# Patient Record
Sex: MALE | Race: WHITE | HISPANIC OR LATINO | Employment: OTHER | URBAN - METROPOLITAN AREA
[De-identification: names, ages, dates, MRNs, and addresses within clinical notes are randomized per-mention and may not be internally consistent; named-entity substitution may affect disease eponyms.]

---

## 2021-02-12 ENCOUNTER — IMMUNIZATION (OUTPATIENT)
Dept: VACCINE CLINIC | Facility: HOSPITAL | Age: 86
End: 2021-02-12

## 2021-02-12 PROCEDURE — 0011A: CPT | Performed by: INTERNAL MEDICINE

## 2021-02-12 PROCEDURE — 91301 HC SARSCO02 VAC 100MCG/0.5ML IM: CPT | Performed by: INTERNAL MEDICINE

## 2021-03-12 ENCOUNTER — IMMUNIZATION (OUTPATIENT)
Dept: VACCINE CLINIC | Facility: HOSPITAL | Age: 86
End: 2021-03-12

## 2021-03-12 PROCEDURE — 91301 HC SARSCO02 VAC 100MCG/0.5ML IM: CPT | Performed by: PHYSICIAN ASSISTANT

## 2021-03-12 PROCEDURE — 0012A: CPT | Performed by: PHYSICIAN ASSISTANT

## 2022-08-30 ENCOUNTER — OFFICE VISIT (OUTPATIENT)
Dept: INTERNAL MEDICINE | Facility: CLINIC | Age: 87
End: 2022-08-30

## 2022-08-30 VITALS
HEIGHT: 70 IN | DIASTOLIC BLOOD PRESSURE: 68 MMHG | RESPIRATION RATE: 16 BRPM | SYSTOLIC BLOOD PRESSURE: 170 MMHG | HEART RATE: 68 BPM | BODY MASS INDEX: 28.35 KG/M2 | OXYGEN SATURATION: 96 % | TEMPERATURE: 97.7 F | WEIGHT: 198 LBS

## 2022-08-30 DIAGNOSIS — E78.2 MIXED HYPERLIPIDEMIA: ICD-10-CM

## 2022-08-30 DIAGNOSIS — I10 BENIGN ESSENTIAL HYPERTENSION: Primary | ICD-10-CM

## 2022-08-30 DIAGNOSIS — E11.65 TYPE 2 DIABETES MELLITUS WITH HYPERGLYCEMIA, WITHOUT LONG-TERM CURRENT USE OF INSULIN: ICD-10-CM

## 2022-08-30 PROCEDURE — 99204 OFFICE O/P NEW MOD 45 MIN: CPT | Performed by: INTERNAL MEDICINE

## 2022-08-30 RX ORDER — ROSUVASTATIN CALCIUM 20 MG/1
1 TABLET, COATED ORAL DAILY
COMMUNITY
Start: 2022-07-28 | End: 2023-01-16 | Stop reason: SDUPTHER

## 2022-08-30 RX ORDER — CARVEDILOL 6.25 MG/1
2 TABLET ORAL DAILY
COMMUNITY
Start: 2022-08-26 | End: 2023-01-16 | Stop reason: SDUPTHER

## 2022-11-23 ENCOUNTER — TELEPHONE (OUTPATIENT)
Dept: INTERNAL MEDICINE | Facility: CLINIC | Age: 87
End: 2022-11-23

## 2022-11-23 NOTE — TELEPHONE ENCOUNTER
Caller: Kieran Jackman    Relationship: Self    Best call back number: [phone] OR CALL HOME NUMBER: [phone]    What form or medical record are you requesting: BLOOD WORK ORDER PAPERWORK     Who is requesting this form or medical record from you: KIERAN JACKMAN    How would you like to receive the form or medical records (pick-up, mail, fax):     PATIENT WOULD LIKE TO HAVE THE BLOOD WORK ORDER PAPERWORK MAILED TO HIM AT THE ADDRESS LISTED ON FILE.     Timeframe paperwork needed: NO LATER THAN January 1st, 2023    Additional notes: PATIENT IS REQUESTING TO HAVE BLOOD WORK DRAWN AT [facility].    PATIENT ADVISED HE MISPLACED THE ORIGINAL PAPERWORK REGARDING BLOOD WORK ORDERS AND IS REQUESTING NEW COPIES BE MAILED TO HIM.

## 2023-01-16 ENCOUNTER — OFFICE VISIT (OUTPATIENT)
Dept: INTERNAL MEDICINE | Facility: CLINIC | Age: 88
End: 2023-01-16
Payer: COMMERCIAL

## 2023-01-16 VITALS
DIASTOLIC BLOOD PRESSURE: 76 MMHG | WEIGHT: 199 LBS | OXYGEN SATURATION: 97 % | HEART RATE: 68 BPM | TEMPERATURE: 97.7 F | HEIGHT: 70 IN | BODY MASS INDEX: 28.49 KG/M2 | RESPIRATION RATE: 16 BRPM | SYSTOLIC BLOOD PRESSURE: 148 MMHG

## 2023-01-16 DIAGNOSIS — N18.30 STAGE 3 CHRONIC KIDNEY DISEASE, UNSPECIFIED WHETHER STAGE 3A OR 3B CKD: ICD-10-CM

## 2023-01-16 DIAGNOSIS — E78.2 MIXED HYPERLIPIDEMIA: ICD-10-CM

## 2023-01-16 DIAGNOSIS — Z00.00 ROUTINE GENERAL MEDICAL EXAMINATION AT A HEALTH CARE FACILITY: Primary | ICD-10-CM

## 2023-01-16 DIAGNOSIS — E11.65 TYPE 2 DIABETES MELLITUS WITH HYPERGLYCEMIA, WITHOUT LONG-TERM CURRENT USE OF INSULIN: ICD-10-CM

## 2023-01-16 DIAGNOSIS — Z23 NEED FOR PNEUMOCOCCAL VACCINATION: ICD-10-CM

## 2023-01-16 DIAGNOSIS — I10 BENIGN ESSENTIAL HYPERTENSION: ICD-10-CM

## 2023-01-16 PROCEDURE — 90677 PCV20 VACCINE IM: CPT | Performed by: INTERNAL MEDICINE

## 2023-01-16 PROCEDURE — 99397 PER PM REEVAL EST PAT 65+ YR: CPT | Performed by: INTERNAL MEDICINE

## 2023-01-16 PROCEDURE — 90471 IMMUNIZATION ADMIN: CPT | Performed by: INTERNAL MEDICINE

## 2023-01-16 RX ORDER — GLIMEPIRIDE 4 MG/1
4 TABLET ORAL DAILY
Qty: 90 TABLET | Refills: 1 | Status: SHIPPED | OUTPATIENT
Start: 2023-01-16

## 2023-01-16 RX ORDER — ROSUVASTATIN CALCIUM 20 MG/1
20 TABLET, COATED ORAL NIGHTLY
Qty: 90 TABLET | Refills: 1 | Status: SHIPPED | OUTPATIENT
Start: 2023-01-16

## 2023-01-16 RX ORDER — CARVEDILOL 6.25 MG/1
TABLET ORAL
COMMUNITY
End: 2023-01-16 | Stop reason: SDUPTHER

## 2023-01-16 RX ORDER — OLMESARTAN MEDOXOMIL 20 MG/1
TABLET ORAL
COMMUNITY
Start: 2022-11-21 | End: 2023-01-16 | Stop reason: SDUPTHER

## 2023-01-16 RX ORDER — TAMSULOSIN HYDROCHLORIDE 0.4 MG/1
1 CAPSULE ORAL NIGHTLY
Qty: 90 CAPSULE | Refills: 1 | Status: SHIPPED | OUTPATIENT
Start: 2023-01-16

## 2023-01-16 RX ORDER — TAMSULOSIN HYDROCHLORIDE 0.4 MG/1
CAPSULE ORAL
COMMUNITY
End: 2023-01-16 | Stop reason: SDUPTHER

## 2023-01-16 RX ORDER — OLMESARTAN MEDOXOMIL 20 MG/1
20 TABLET ORAL DAILY
Qty: 90 TABLET | Refills: 1 | Status: SHIPPED | OUTPATIENT
Start: 2023-01-16

## 2023-01-16 RX ORDER — CARVEDILOL 12.5 MG/1
12.5 TABLET ORAL 2 TIMES DAILY WITH MEALS
Qty: 180 TABLET | Refills: 1 | Status: SHIPPED | OUTPATIENT
Start: 2023-01-16

## 2023-01-16 NOTE — PROGRESS NOTES
Subjective   Kieran Clark is a 87 y.o. male.     Chief Complaint   Patient presents with   • Annual Exam     Lab work done [facility]       History of Present Illness   Patient is here for a physical exam, he is also to follow-up on his blood pressure, he is also here to follow-up on lab work done for sugar and cholesterol, he is due for a Prevnar  Review of Systems   All other systems reviewed and are negative.      Past Medical History:   Diagnosis Date   • Diabetes mellitus (HCC)    • Hyperlipidemia    • Hypertension        Past Surgical History:   Procedure Laterality Date   • COLONOSCOPY  2018    [place]   • SPINE SURGERY     • WRIST SURGERY Right        History reviewed. No pertinent family history.     reports that he quit smoking about 38 years ago. His smoking use included cigarettes. He has a 12.50 pack-year smoking history. He has never used smokeless tobacco. He reports current alcohol use. He reports that he does not use drugs.    No Known Allergies        Current Outpatient Medications:   •  aspirin 81 MG oral suspension, , Disp: , Rfl:   •  carvedilol (COREG) 12.5 MG tablet, Take 1 tablet by mouth 2 (Two) Times a Day With Meals., Disp: 180 tablet, Rfl: 1  •  Cholecalciferol (Vitamin D3) 25 MCG (1000 UT) capsule, , Disp: , Rfl:   •  Cyanocobalamin (Vitamin B12) 1000 MCG tablet controlled-release, , Disp: , Rfl:   •  Gluc-Chonn-MSM-Boswellia-Vit D (GLUCOS CHONDROIT, BOSWELLIA, PO), , Disp: , Rfl:   •  metFORMIN (GLUCOPHAGE) 500 MG tablet, Take 3 tablets by mouth Daily., Disp: 90 tablet, Rfl: 5  •  olmesartan (BENICAR) 20 MG tablet, Take 1 tablet by mouth Daily., Disp: 90 tablet, Rfl: 1  •  rosuvastatin (CRESTOR) 20 MG tablet, Take 1 tablet by mouth Every Night., Disp: 90 tablet, Rfl: 1  •  tamsulosin (FLOMAX) 0.4 MG capsule 24 hr capsule, Take 1 capsule by mouth Every Night., Disp: 90 capsule, Rfl: 1  •  glimepiride (Amaryl) 4 MG tablet, Take 1 tablet by mouth Daily., Disp: 90 tablet, Rfl:  "1      Objective   Blood pressure 148/76, pulse 68, temperature 97.7 °F (36.5 °C), resp. rate 16, height 177.8 cm (70\"), weight 90.3 kg (199 lb), SpO2 97 %.    Physical Exam  Vitals and nursing note reviewed.   Constitutional:       General: He is not in acute distress.     Appearance: Normal appearance. He is not diaphoretic.   HENT:      Head: Normocephalic and atraumatic.      Right Ear: External ear normal.      Left Ear: External ear normal.      Nose: Nose normal.   Eyes:      Extraocular Movements: Extraocular movements intact.      Conjunctiva/sclera: Conjunctivae normal.   Neck:      Trachea: Trachea normal.   Cardiovascular:      Rate and Rhythm: Normal rate and regular rhythm.      Heart sounds: Normal heart sounds.   Pulmonary:      Effort: Pulmonary effort is normal. No respiratory distress.   Abdominal:      General: Abdomen is flat.   Musculoskeletal:      Cervical back: Neck supple.      Comments: Moves all limbs  Walks with cane   Skin:     General: Skin is warm and dry.      Findings: No erythema.   Neurological:      Mental Status: He is alert and oriented to person, place, and time.      Comments: No gross motor or sensory deficits         BMI is >= 25 and <30. (Overweight) The following options were offered after discussion;: weight loss educational material (shared in after visit summary), exercise counseling/recommendations and nutrition counseling/recommendations      No results found for this or any previous visit.      Assessment & Plan   Diagnoses and all orders for this visit:    1. Routine general medical examination at a health care facility (Primary)    2. Benign essential hypertension  -     olmesartan (BENICAR) 20 MG tablet; Take 1 tablet by mouth Daily.  Dispense: 90 tablet; Refill: 1  -     carvedilol (COREG) 12.5 MG tablet; Take 1 tablet by mouth 2 (Two) Times a Day With Meals.  Dispense: 180 tablet; Refill: 1    3. Mixed hyperlipidemia  -     CBC & Differential  -     " Comprehensive Metabolic Panel  -     Lipid Panel  -     rosuvastatin (CRESTOR) 20 MG tablet; Take 1 tablet by mouth Every Night.  Dispense: 90 tablet; Refill: 1    4. Type 2 diabetes mellitus with hyperglycemia, without long-term current use of insulin ([facility])  -     Hemoglobin A1c  -     Microalbumin / Creatinine Urine Ratio - Urine, Clean Catch  -     glimepiride (Amaryl) 4 MG tablet; Take 1 tablet by mouth Daily.  Dispense: 90 tablet; Refill: 1  -     metFORMIN (GLUCOPHAGE) 500 MG tablet; Take 3 tablets by mouth Daily.  Dispense: 90 tablet; Refill: 5    5. Stage 3 chronic kidney disease, unspecified whether stage 3a or 3b CKD ([facility])  -     Ambulatory Referral to Nephrology    6. Need for pneumococcal vaccination  -     Pneumococcal Conjugate Vaccine 20-[facility]    Other orders  -     tamsulosin (FLOMAX) 0.4 MG capsule 24 hr capsule; Take 1 capsule by mouth Every Night.  Dispense: 90 capsule; Refill: 1      plan:  1.physical: Physical exam conducted today,   reviewed fasting lab work,   Impression: healthy adult Patient. Currently, patient eats a healthy diet. Screening lab work includes glucose, lipid profile , complete blood count and comprehensive panel . The risks and benefits of immunizations were discussed and immunizations are up to date. Advice and education were given regarding nutrition and aerobic exercise. Patient discussion: discussed with the patient.  Physical with preventive exam as well as age and risk appropriate counseling completed.   Patient Discussion: plan discussed with the patient, follow-up visit needed in one year. Medication Review and medication list updated  Advised   regular weight-bearing exercise  2.  Benign essential hypertension: Will continue current medication and increase to carvedilol 12.5 mg p.o. twice daily, low-sodium diet advised, Counseled to regularly check BP at home with goal averaging <130/80.   3.mixed hyperlipidemia: Reviewed fasting CMP and lipid panel.  Diet and  exercise counseled,  Will continue current medications  4. Diabetes  Mellitus  : Reviewed fasting CMP  and hba1c   7.3 , diet and exercise counseled , Will continue current medications  5.   ckd stage 3 : Labs reviewed, oral hydration, monitor BMP , refer to nephrology  6.  Need for Prevnar 20: Given today and well-tolerated             Yarelis Ruiz MD

## 2023-01-20 ENCOUNTER — FLU SHOT (OUTPATIENT)
Dept: INTERNAL MEDICINE | Facility: CLINIC | Age: 88
End: 2023-01-20
Payer: COMMERCIAL

## 2023-01-20 DIAGNOSIS — Z23 NEED FOR PNEUMOCOCCAL VACCINATION: Primary | ICD-10-CM

## 2023-03-28 ENCOUNTER — TRANSCRIBE ORDERS (OUTPATIENT)
Dept: ADMINISTRATIVE | Facility: HOSPITAL | Age: 88
End: 2023-03-28
Payer: COMMERCIAL

## 2023-03-28 DIAGNOSIS — N18.31 CHRONIC KIDNEY DISEASE (CKD) STAGE G3A/A1, MODERATELY DECREASED GLOMERULAR FILTRATION RATE (GFR) BETWEEN 45-59 ML/MIN/1.73 SQUARE METER AND ALBUMINURIA CREATININE RATIO LESS THAN 30 MG/G (CMS/H*: Primary | ICD-10-CM

## 2023-05-15 ENCOUNTER — HOSPITAL ENCOUNTER (OUTPATIENT)
Dept: ULTRASOUND IMAGING | Facility: HOSPITAL | Age: 88
Discharge: HOME | End: 2023-05-15
Admitting: PHYSICIAN ASSISTANT
Payer: COMMERCIAL

## 2023-05-15 DIAGNOSIS — N18.31 CHRONIC KIDNEY DISEASE (CKD) STAGE G3A/A1, MODERATELY DECREASED GLOMERULAR FILTRATION RATE (GFR) BETWEEN 45-59 ML/MIN/1.73 SQUARE METER AND ALBUMINURIA CREATININE RATIO LESS THAN 30 MG/G (CMS/H*: ICD-10-CM

## 2023-05-15 PROCEDURE — 76775 US EXAM ABDO BACK WALL LIM: CPT

## 2023-11-08 ENCOUNTER — OFFICE VISIT (OUTPATIENT)
Dept: INTERNAL MEDICINE | Facility: CLINIC | Age: 88
End: 2023-11-08
Payer: COMMERCIAL

## 2023-11-08 VITALS
WEIGHT: 180 LBS | DIASTOLIC BLOOD PRESSURE: 71 MMHG | TEMPERATURE: 98.2 F | BODY MASS INDEX: 25.77 KG/M2 | RESPIRATION RATE: 16 BRPM | HEIGHT: 70 IN | SYSTOLIC BLOOD PRESSURE: 150 MMHG | OXYGEN SATURATION: 99 % | HEART RATE: 63 BPM

## 2023-11-08 DIAGNOSIS — E11.9 DIABETES MELLITUS WITHOUT COMPLICATION: ICD-10-CM

## 2023-11-08 DIAGNOSIS — E78.2 MIXED HYPERLIPIDEMIA: ICD-10-CM

## 2023-11-08 DIAGNOSIS — Z23 NEED FOR IMMUNIZATION AGAINST INFLUENZA: ICD-10-CM

## 2023-11-08 DIAGNOSIS — N18.31 STAGE 3A CHRONIC KIDNEY DISEASE: ICD-10-CM

## 2023-11-08 DIAGNOSIS — I10 BENIGN ESSENTIAL HYPERTENSION: Primary | ICD-10-CM

## 2023-11-08 PROCEDURE — 99214 OFFICE O/P EST MOD 30 MIN: CPT | Performed by: INTERNAL MEDICINE

## 2023-11-08 PROCEDURE — 90471 IMMUNIZATION ADMIN: CPT | Performed by: INTERNAL MEDICINE

## 2023-11-08 PROCEDURE — 90662 IIV NO PRSV INCREASED AG IM: CPT | Performed by: INTERNAL MEDICINE

## 2023-11-08 RX ORDER — CARVEDILOL 12.5 MG/1
12.5 TABLET ORAL 2 TIMES DAILY WITH MEALS
Qty: 180 TABLET | Refills: 1 | Status: SHIPPED | OUTPATIENT
Start: 2023-11-08

## 2023-11-08 RX ORDER — ROSUVASTATIN CALCIUM 20 MG/1
20 TABLET, COATED ORAL NIGHTLY
Qty: 90 TABLET | Refills: 1 | Status: SHIPPED | OUTPATIENT
Start: 2023-11-08

## 2023-11-08 RX ORDER — OLMESARTAN MEDOXOMIL 20 MG/1
20 TABLET ORAL DAILY
Qty: 90 TABLET | Refills: 1 | Status: SHIPPED | OUTPATIENT
Start: 2023-11-08

## 2023-11-08 RX ORDER — TAMSULOSIN HYDROCHLORIDE 0.4 MG/1
1 CAPSULE ORAL NIGHTLY
Qty: 90 CAPSULE | Refills: 1 | Status: SHIPPED | OUTPATIENT
Start: 2023-11-08

## 2023-11-08 NOTE — PROGRESS NOTES
"Chief Complaint  Hypertension and Urinary Tract Infection ([facility] admit 10/19/2023 discharge 10/21/2023)    Rubens Clark presents to [facility]  HPI: Patient is here to follow up on the blood pressure  The patient is taking the blood pressure medications as prescribed and has had no side effects. The patient is also here to follow up on the cholesterol and sugar and is trying to follow a diet.  He is noted to have lost weight from his recent illness, the patient is  also here to follow up on ckd  and is  due to get lab work done .  He follows up with nephrology, the patient also needs refills on medications and flu vaccine.  Patient states he was admitted at [facility] October 19, 2023 and discharged on October 21, 2023 for acute urinary tract infection and [facility]  medical records have been reviewed  Hyperlipidemia   Pertinent negatives include no chest pain or shortness of breath.   Hypertension   Pertinent negatives include no chest pain, palpitations or shortness of breath.    Hypertension    Urinary Tract Infection       Objective   Vital Signs:  /71   Pulse 63   Temp 98.2 °F (36.8 °C)   Resp 16   Ht 177.8 cm (70\")   Wt 81.6 kg (180 lb)   SpO2 99%   BMI 25.83 kg/m²   Estimated body mass index is 25.83 kg/m² as calculated from the following:    Height as of this encounter: 177.8 cm (70\").    Weight as of this encounter: 81.6 kg (180 lb).               Physical Exam  Vitals and nursing note reviewed.   Constitutional:       General: He is not in acute distress.     Appearance: Normal appearance. He is not diaphoretic.   HENT:      Head: Normocephalic and atraumatic.      Right Ear: External ear normal.      Left Ear: External ear normal.      Nose: Nose normal.   Eyes:      Extraocular Movements: Extraocular movements intact.      Conjunctiva/sclera: Conjunctivae normal.   Neck:      Trachea: Trachea normal.   Cardiovascular:      Rate and " Rhythm: Normal rate and regular rhythm.      Heart sounds: Normal heart sounds.   Pulmonary:      Effort: Pulmonary effort is normal. No respiratory distress.   Abdominal:      General: Abdomen is flat.   Musculoskeletal:         General: Deformity present.      Cervical back: Neck supple.      Comments: Moves all limbs   Skin:     General: Skin is warm and dry.      Findings: No erythema.   Neurological:      Mental Status: He is alert and oriented to person, place, and time.      Comments: No gross motor or sensory deficits        Result Review :  The following data was reviewed by: Yarelis Ruiz MD on 11/08/2023:  Common labs          10/19/2023    17:01 10/19/2023    17:16 10/20/2023    06:37 10/21/2023    03:59   Common Labs   Glucose 185          EGFR  Am  41         Potassium 4.3          Chloride 96          WBC 9.20      7.44     6.17       Hemoglobin 14.2      12.4     12.9       Hematocrit 41.4      36.4     37.8       Platelets 201      180     205       Hemoglobin A1C   6.2           Details          This result is from an external source.                 CT Head Without Contrast (10/19/2023 17:47)  CT Angiogram Head (10/19/2023 17:47)  CT Angiogram Neck (10/19/2023 17:47)  XR Spine Lumbar 2 or 3 View (10/19/2023 17:16)         Assessment and Plan   Diagnoses and all orders for this visit:    1. Benign essential hypertension (Primary)  -     carvedilol (COREG) 12.5 MG tablet; Take 1 tablet by mouth 2 (Two) Times a Day With Meals.  Dispense: 180 tablet; Refill: 1  -     olmesartan (BENICAR) 20 MG tablet; Take 1 tablet by mouth Daily.  Dispense: 90 tablet; Refill: 1    2. Mixed hyperlipidemia  -     CBC & Differential  -     Comprehensive Metabolic Panel  -     Lipid Panel  -     rosuvastatin (CRESTOR) 20 MG tablet; Take 1 tablet by mouth Every Night.  Dispense: 90 tablet; Refill: 1    3. Diabetes mellitus without complication  -     Hemoglobin A1c  -     Microalbumin / Creatinine Urine Ratio -  Urine, Clean Catch    4. Stage 3a chronic kidney disease    5. Need for immunization against influenza  -     Fluzone High-Dose 65+yrs ([id number])    Other orders  -     tamsulosin (FLOMAX) 0.4 MG capsule 24 hr capsule; Take 1 capsule by mouth Every Night.  Dispense: 90 capsule; Refill: 1    Plan:  1.  Benign essential hypertension: Will continue current medication, low-sodium diet advised, Counseled to regularly check BP at home with goal averaging <130/80.   2.mixed hyperlipidemia: will obtain   fasting CMP and lipid panel.  Diet and exercise counseled,  Will continue current medications  3. Diabetes  Mellitus  : will obtain   fasting CMP  and hba1c  , diet and exercise counseled ,    4.  CKD stage IIIa: Oral hydration, follow-up with nephrology, monitor BMP  5.  Need for flu vaccine: Given today and well-tolerated         Follow Up      Patient was given instructions and counseling regarding his condition or for health maintenance advice. Please see specific information pulled into the AVS if appropriate.

## 2023-11-29 ENCOUNTER — OFFICE VISIT (OUTPATIENT)
Dept: INTERNAL MEDICINE | Facility: CLINIC | Age: 88
End: 2023-11-29
Payer: COMMERCIAL

## 2023-11-29 VITALS
HEIGHT: 70 IN | TEMPERATURE: 97.8 F | RESPIRATION RATE: 16 BRPM | DIASTOLIC BLOOD PRESSURE: 70 MMHG | OXYGEN SATURATION: 97 % | WEIGHT: 185 LBS | HEART RATE: 55 BPM | BODY MASS INDEX: 26.48 KG/M2 | SYSTOLIC BLOOD PRESSURE: 131 MMHG

## 2023-11-29 DIAGNOSIS — I10 BENIGN ESSENTIAL HYPERTENSION: Primary | ICD-10-CM

## 2023-11-29 DIAGNOSIS — E11.9 DIABETES MELLITUS WITHOUT COMPLICATION: ICD-10-CM

## 2023-11-29 DIAGNOSIS — K59.00 CONSTIPATION, UNSPECIFIED CONSTIPATION TYPE: ICD-10-CM

## 2023-11-29 DIAGNOSIS — N18.31 STAGE 3A CHRONIC KIDNEY DISEASE: ICD-10-CM

## 2023-11-29 DIAGNOSIS — E78.2 MIXED HYPERLIPIDEMIA: ICD-10-CM

## 2023-11-29 PROCEDURE — 99214 OFFICE O/P EST MOD 30 MIN: CPT | Performed by: INTERNAL MEDICINE

## 2023-11-29 RX ORDER — GLIMEPIRIDE 1 MG/1
1 TABLET ORAL DAILY
Qty: 90 TABLET | Refills: 1 | Status: SHIPPED | OUTPATIENT
Start: 2023-11-29

## 2023-11-29 NOTE — PROGRESS NOTES
"Chief Complaint  Hypertension and Constipation (Has been using prune juice, not working)    Subjective        Kieran Clark presents to [facility]  HPI: Patient is here to follow up on the blood pressure  The patient is taking the blood pressure medications as prescribed and has had no side effects. The patient is also here to follow up on the cholesterol and is trying to follow a diet and has lost weight. The patient is  also here to follow up on  ckd and had lab work done .  The patient also needs refills on medications .  Patient also complains of constipation  Hyperlipidemia   Pertinent negatives include no chest pain or shortness of breath.   Hypertension   Pertinent negatives include no chest pain, palpitations or shortness of breath.  Hypertension    Constipation          Objective   Vital Signs:  /70   Pulse 55   Temp 97.8 °F (36.6 °C)   Resp 16   Ht 177.8 cm (70\")   Wt 83.9 kg (185 lb)   SpO2 97%   BMI 26.54 kg/m²   Estimated body mass index is 26.54 kg/m² as calculated from the following:    Height as of this encounter: 177.8 cm (70\").    Weight as of this encounter: 83.9 kg (185 lb).               Physical Exam  Vitals and nursing note reviewed.   Constitutional:       General: He is not in acute distress.     Appearance: Normal appearance. He is not diaphoretic.   HENT:      Head: Normocephalic and atraumatic.      Right Ear: External ear normal.      Left Ear: External ear normal.      Nose: Nose normal.   Eyes:      Extraocular Movements: Extraocular movements intact.      Conjunctiva/sclera: Conjunctivae normal.   Neck:      Trachea: Trachea normal.   Cardiovascular:      Rate and Rhythm: Normal rate and regular rhythm.      Heart sounds: Normal heart sounds.   Pulmonary:      Effort: Pulmonary effort is normal. No respiratory distress.   Abdominal:      General: Abdomen is flat.   Musculoskeletal:      Cervical back: Neck supple.      Comments: Moves all " limbs   Skin:     General: Skin is warm and dry.      Findings: No erythema.   Neurological:      Mental Status: He is alert and oriented to person, place, and time.      Comments: No gross motor or sensory deficits        Result Review :  The following data was reviewed by: Yarelis Ruiz MD on 11/29/2023:  Common labs          10/19/2023    17:01 10/19/2023    17:16 10/20/2023    06:37 10/21/2023    03:59   Common Labs   Glucose 185          EGFR  Am  41         Potassium 4.3          Chloride 96          WBC 9.20      7.44     6.17       Hemoglobin 14.2      12.4     12.9       Hematocrit 41.4      36.4     37.8       Platelets 201      180     205       Hemoglobin A1C   6.2           Details          This result is from an external source.                          Assessment and Plan   Diagnoses and all orders for this visit:    1. Benign essential hypertension (Primary)    2. Mixed hyperlipidemia  -     CBC & Differential  -     Comprehensive Metabolic Panel  -     Lipid Panel    3. Diabetes mellitus without complication  -     glimepiride (Amaryl) 1 MG tablet; Take 1 tablet by mouth Daily.  Dispense: 90 tablet; Refill: 1  -     Hemoglobin A1c  -     Microalbumin / Creatinine Urine Ratio - Urine, Clean Catch    4. Stage 3a chronic kidney disease    5. Constipation, unspecified constipation type    Plan:  1.  Benign essential hypertension: Will continue current medication, low-sodium diet advised, Counseled to regularly check BP at home with goal averaging <130/80.   2.mixed hyperlipidemia: Reviewed    fasting CMP and lipid panel.  Diet and exercise counseled,  Will continue current medications  3. Diabetes  Mellitus  :    Reviewed fasting CMP  and hba1c   6.6 , diet and exercise counseled , Will start amaryl 1mg po qd   4.  CKD stage IIIa: Oral hydration monitor [facility]  5.  Constipation: Advised OTC medication, oral hydration       Follow Up      Patient was given instructions and counseling regarding his  condition or for health maintenance advice. Please see specific information pulled into the AVS if appropriate.

## 2023-12-08 DIAGNOSIS — R31.0 GROSS HEMATURIA: Primary | ICD-10-CM

## 2023-12-15 ENCOUNTER — OFFICE VISIT (OUTPATIENT)
Dept: UROLOGY | Facility: CLINIC | Age: 88
End: 2023-12-15
Payer: COMMERCIAL

## 2023-12-15 VITALS
HEART RATE: 73 BPM | OXYGEN SATURATION: 96 % | HEIGHT: 70 IN | DIASTOLIC BLOOD PRESSURE: 62 MMHG | SYSTOLIC BLOOD PRESSURE: 118 MMHG | BODY MASS INDEX: 26.48 KG/M2 | TEMPERATURE: 98.6 F | WEIGHT: 185 LBS

## 2023-12-15 DIAGNOSIS — N18.9 CHRONIC KIDNEY DISEASE, UNSPECIFIED CKD STAGE: ICD-10-CM

## 2023-12-15 DIAGNOSIS — R31.0 GROSS HEMATURIA: Primary | ICD-10-CM

## 2023-12-15 LAB
BILIRUB BLD-MCNC: NEGATIVE MG/DL
CLARITY, POC: CLEAR
COLOR UR: YELLOW
EXPIRATION DATE: ABNORMAL
GLUCOSE UR STRIP-MCNC: NEGATIVE MG/DL
KETONES UR QL: NEGATIVE
LEUKOCYTE EST, POC: ABNORMAL
Lab: ABNORMAL
NITRITE UR-MCNC: NEGATIVE MG/ML
PH UR: 5 [PH] (ref 5–8)
PROT UR STRIP-MCNC: NEGATIVE MG/DL
RBC # UR STRIP: ABNORMAL /UL
SP GR UR: 1.02 (ref 1–1.03)
UROBILINOGEN UR QL: NORMAL

## 2023-12-15 NOTE — PROGRESS NOTES
"Chief Complaint  Gross hematuria    HPI  Mr. Clark is a 88 y.o. male with history of BPH (patient reported) who presents with gross hematuria.      The last week of September, had a tooth extracted and a dental cleaning. 3-4 days later, states he became very sick and couldn't get out of bed due to profound weakness. Went to the ER in [place] and he was diagnosed with a UTI and admitted. Treated with antibiotics and went home. States that afterwards, developed severe constipation. When constipation resolved, states that the BM was very hard and painful. Then the gross hematuria started and lasted for 3-4 days.     States that at worse, GH was passing blood clots as \"large as his thumb\".     History of smoking?    yes, smoked for 25 years, sometimes 0.5ppd or pipe smoking instead  History of second-hand smoking exposure? yes  History of chemotherapy?   no  History of radiation?    no  History of kidney or bladder stones?  no  History of frequent urinary tract infections? no        He currently denies fevers, chills, nausea, vomiting, constipation or flank pain.    IPSS Questionnaire (AUA-7):  Over the past month…    1)  Incomplete Emptying  How often have you had a sensation of not emptying your bladder?  0 - Not at all   2)  Frequency  How often have you had to urinate less than every two hours? 4 - More than half the time   3)  Intermittency  How often have you found you stopped and started again several times when you urinated?  2 - Less than half the time   4) Urgency  How often have you found it difficult to postpone urination?  4 - More than half the time   5) Weak Stream  How often have you had a weak urinary stream?  3 - About half the time   6) Straining  How often have you had to push or strain to begin urination?  0 - Not at all   7) Nocturia  How many times did you typically get up at night to urinate?  2 - 2 times   Total Score:  15       Quality of life due to urinary symptoms:  If you were to spend " the rest of your life with your urinary condition the way it is now, how would you feel about that? 2-Mostly Satisfied   Urine Leakage (Incontinence) 0-No Leakage         Past Medical History  Past Medical History:   Diagnosis Date    Diabetes mellitus     Hyperlipidemia     Hypertension        Past Surgical History  Past Surgical History:   Procedure Laterality Date    COLONOSCOPY  2018    [place]    SPINE SURGERY      WRIST SURGERY Right        Medications    Current Outpatient Medications:     aspirin 81 MG oral suspension, , Disp: , Rfl:     carvedilol (COREG) 12.5 MG tablet, Take 1 tablet by mouth 2 (Two) Times a Day With Meals., Disp: 180 tablet, Rfl: 1    Cholecalciferol (Vitamin D3) 25 MCG (1000 UT) capsule, , Disp: , Rfl:     Cyanocobalamin (Vitamin B12) 1000 MCG tablet controlled-release, , Disp: , Rfl:     glimepiride (Amaryl) 1 MG tablet, Take 1 tablet by mouth Daily., Disp: 90 tablet, Rfl: 1    Gluc-Chonn-MSM-Boswellia-Vit D (GLUCOS CHONDROIT, BOSWELLIA, PO), , Disp: , Rfl:     olmesartan (BENICAR) 20 MG tablet, Take 1 tablet by mouth Daily., Disp: 90 tablet, Rfl: 1    rosuvastatin (CRESTOR) 20 MG tablet, Take 1 tablet by mouth Every Night., Disp: 90 tablet, Rfl: 1    tamsulosin (FLOMAX) 0.4 MG capsule 24 hr capsule, Take 1 capsule by mouth Every Night., Disp: 90 capsule, Rfl: 1    Allergies  No Known Allergies    Social History  Social History     Socioeconomic History    Marital status:    Tobacco Use    Smoking status: Former     Packs/day: 0.50     Years: 25.00     Additional pack years: 0.00     Total pack years: 12.50     Types: Cigarettes     Quit date:      Years since quittin.9    Smokeless tobacco: Never   Substance and Sexual Activity    Alcohol use: Yes     Comment: social    Drug use: Never    Sexual activity: Defer       Family History  He has no family history of prostate, bladder or kidney cancer  He has no family history of kidney stones      Physical Exam  Visit  "Vitals  /62 (BP Location: Left arm, Patient Position: Sitting, Cuff Size: Adult)   Pulse 73   Temp 98.6 °F (37 °C) (Temporal)   Ht 177.8 cm (70\")   Wt 83.9 kg (185 lb)   SpO2 96%   BMI 26.54 kg/m²       Labs  Brief Urine Lab Results  (Last result in the past 365 days)        Color   Clarity   Blood   Leuk Est   Nitrite   Protein   CREAT   Urine HCG        12/15/23 1430 Yellow   Clear   1+   Small (1+)   Negative   Negative                     Radiologic Studies  Narrative & Impression   PROCEDURE: US RENAL BILATERAL-     HISTORY: n18.31; N18.31-Chronic kidney disease, stage 3a     PROCEDURE: Ultrasound images of the kidneys were obtained.     FINDINGS: The kidneys are normal in size with the right kidney measuring  11.1 cm and the left kidney measuring 12.0 cm. The right kidney shows  increased echogenicity of the right renal parenchyma. The left renal  parenchyma is unremarkable. There is a 2 cm cyst in the inferior pole of  left kidney.  There is no hydronephrosis.     IMPRESSION:  1. Increased echogenicity of the right renal parenchyma.  2. A 2 cm left inferior renal cyst.                 Images were reviewed, interpreted, and dictated by Dr. Vikki Hankins MD  Transcribed by Lynn Patel PA-C.     This report was signed and finalized on 5/15/2023 2:41 PM by Vikki Hankins MD.        Assessment  88 y.o. male who presents with gross hematuria. This is a new diagnosis of uncertain prognosis.    He was admitted to  October 19 with UTI (no culture, but UA consistent with infection).  He developed severe constipation and was straining developed gross hematuria with passage of large clots that lasted 3-4 days, no associated pain.    Risk factors include approximately 10 to 25-pack-year smoking history with mixed tobacco products, presence of gross hematuria, age, male sex.  In order to complete the hematuria work up we will perform flexible cystoscopy and acquire upper tract imaging.    Plan  1.  We discussed " the indications for diagnostic flexible cystoscopy to be performed at the next clinic visit.  2.  CTU prior to follow up  3.  IPSS 15, patient satisfied with symptoms

## 2024-01-16 ENCOUNTER — HOSPITAL ENCOUNTER (OUTPATIENT)
Dept: CT IMAGING | Facility: HOSPITAL | Age: 89
Discharge: HOME | End: 2024-01-16
Admitting: PHYSICIAN ASSISTANT
Payer: COMMERCIAL

## 2024-01-16 DIAGNOSIS — R31.0 GROSS HEMATURIA: ICD-10-CM

## 2024-01-16 DIAGNOSIS — N18.9 CHRONIC KIDNEY DISEASE, UNSPECIFIED CKD STAGE: ICD-10-CM

## 2024-01-16 PROCEDURE — 25510000001 IOPAMIDOL 61 % SOLUTION: Performed by: PHYSICIAN ASSISTANT

## 2024-01-16 PROCEDURE — 74178 CT ABD&PLV WO CNTR FLWD CNTR: CPT

## 2024-01-16 RX ADMIN — IOPAMIDOL 100 ML: 612 INJECTION, SOLUTION INTRAVENOUS at 17:41

## 2024-02-08 ENCOUNTER — PROCEDURE VISIT (OUTPATIENT)
Dept: UROLOGY | Facility: CLINIC | Age: 89
End: 2024-02-08
Payer: COMMERCIAL

## 2024-02-08 DIAGNOSIS — R31.0 GROSS HEMATURIA: Primary | ICD-10-CM

## 2024-02-08 DIAGNOSIS — R39.9 LOWER URINARY TRACT SYMPTOMS (LUTS): ICD-10-CM

## 2024-02-08 RX ORDER — FINASTERIDE 5 MG/1
5 TABLET, FILM COATED ORAL DAILY
Qty: 90 TABLET | Refills: 4 | Status: SHIPPED | OUTPATIENT
Start: 2024-02-08

## 2024-02-08 NOTE — PROGRESS NOTES
"Preprocedure diagnosis  Gross hematuria    Postprocedure diagnosis  BPH    Procedure  Flexible Cystourethroscopy    Attending surgeon  Félix Camarillo MD    Anesthesia  2% lidocaine jelly intraurethrally    Complications  None    Indications  88 y.o. male undergoing a flexible cystoscopy for the above mentioned indications.  Informed consent was obtained.      Findings  Cystoscopic findings included one right and left ureteral orifice in the normal anatomic position with normal bladder mucosa and no tumors, masses or stones. The urethral urothelium was within normal limits with no strictures.  There was not a prominent median lobe.  The lateral lobes were incredibly large, long, and obstructive in appearance.  Multiple small capillaries likely the source of his bleeding.    Procedure  The patient was placed in supine position and prepped and draped in sterile fashion with lidocaine jelly per urethra for anesthesia.  A timeout was performed.  The 14F flexible cystoscope was lubricated and gently placed through the penile urethra and into the bladder.  The bladder was completely visualized.  The cystoscope was retroflexed and the bladder neck and prostate visualized.  The cystoscope was slowly withdrawn while visualizing the urethra and the procedure terminated.  The patient tolerated the procedure well.      CT Abdomen Pelvis With & Without Contrast  Result Date: 1/17/2024  1. No renal stones or renal mass. 2. Very large prostate with associated bladder wall thickening likely related to chronic outlet obstruction. 3. Left renal cyst. 4. 3.4 cm infrarenal abdominal aortic aneurysm.      CTDI: 7.06 mGy DLP:901.78 mGy.cm   This report was signed and finalized on 1/17/2024 2:32 PM by Carey Walls MD.      No results found for: \"PSA\"    I personally reviewed all his labs and imaging.  His prostate is over 6 cm in width and likely over 80 cc in size.  It is likely the cause of his bleeding.    He is an 88-year-old " male with history of chronic lower urinary tract symptoms, recent urinary tract infection, and recent episode of gross hematuria after straining due to constipation.  He is currently on Flomax for his chronic lower urinary tract symptoms and is happy with this.    We discussed adding an additional medicine like finasteride to reduce prostatic blood flow and size in order to reduce potential future bleeding episodes.  He was very much in favor of this.  He did not want any potential BPH surgeries.  He is overall happy with his urinary symptoms.  We reviewed the risks and benefits, such as lower libido, erectile dysfunction.  These are not priorities to him.    He will follow-up in 6 months with my APRN with an IPSS.

## 2024-04-03 ENCOUNTER — OFFICE VISIT (OUTPATIENT)
Dept: INTERNAL MEDICINE | Facility: CLINIC | Age: 89
End: 2024-04-03
Payer: COMMERCIAL

## 2024-04-03 VITALS
TEMPERATURE: 98 F | HEIGHT: 70 IN | RESPIRATION RATE: 16 BRPM | HEART RATE: 63 BPM | WEIGHT: 198.4 LBS | BODY MASS INDEX: 28.4 KG/M2 | OXYGEN SATURATION: 94 % | SYSTOLIC BLOOD PRESSURE: 157 MMHG | DIASTOLIC BLOOD PRESSURE: 64 MMHG

## 2024-04-03 DIAGNOSIS — I10 BENIGN ESSENTIAL HYPERTENSION: Primary | ICD-10-CM

## 2024-04-03 DIAGNOSIS — N18.31 STAGE 3A CHRONIC KIDNEY DISEASE: ICD-10-CM

## 2024-04-03 DIAGNOSIS — E78.2 MIXED HYPERLIPIDEMIA: ICD-10-CM

## 2024-04-03 DIAGNOSIS — E11.9 DIABETES MELLITUS WITHOUT COMPLICATION: ICD-10-CM

## 2024-04-03 RX ORDER — CARVEDILOL 12.5 MG/1
12.5 TABLET ORAL 2 TIMES DAILY WITH MEALS
Qty: 180 TABLET | Refills: 1 | Status: SHIPPED | OUTPATIENT
Start: 2024-04-03

## 2024-04-03 RX ORDER — GLIMEPIRIDE 1 MG/1
1 TABLET ORAL DAILY
Qty: 90 TABLET | Refills: 1 | Status: SHIPPED | OUTPATIENT
Start: 2024-04-03

## 2024-04-03 RX ORDER — OLMESARTAN MEDOXOMIL 20 MG/1
20 TABLET ORAL DAILY
Qty: 90 TABLET | Refills: 1 | Status: SHIPPED | OUTPATIENT
Start: 2024-04-03

## 2024-04-03 RX ORDER — ROSUVASTATIN CALCIUM 20 MG/1
20 TABLET, COATED ORAL NIGHTLY
Qty: 90 TABLET | Refills: 1 | Status: SHIPPED | OUTPATIENT
Start: 2024-04-03

## 2024-04-03 NOTE — PROGRESS NOTES
"Chief Complaint  Hypertension, Hyperlipidemia, and Diabetes    Subjective        Kieran Clark presents to [facility]  HPI: Patient is here to follow up on the blood pressure which is noted to be elevated and he is advised to monitor his blood pressure at home , patient is taking the blood pressure medications as prescribed and has had no side effects. The patient is also here to follow up on the cholesterol , sugar and ckd and  lab work done .  He is advised to follow-up with nephrology, the patient also needs refills on medications .   Hyperlipidemia   Pertinent negatives include no chest pain or shortness of breath.   Hypertension   Pertinent negatives include no chest pain, palpitations or shortness of breath.      Objective   Vital Signs:  /64   Pulse 63   Temp 98 °F (36.7 °C) (Tympanic)   Resp 16   Ht 177.8 cm (70\")   Wt 90 kg (198 lb 6.4 oz)   SpO2 94%   BMI 28.47 kg/m²   Estimated body mass index is 28.47 kg/m² as calculated from the following:    Height as of this encounter: 177.8 cm (70\").    Weight as of this encounter: 90 kg (198 lb 6.4 oz).       BMI is >= 25 and <30. (Overweight) The following options were offered after discussion;: weight loss educational material (shared in after visit summary), exercise counseling/recommendations, and nutrition counseling/recommendations      Physical Exam  Vitals and nursing note reviewed.   Constitutional:       General: He is not in acute distress.     Appearance: Normal appearance. He is not diaphoretic.   HENT:      Head: Normocephalic and atraumatic.      Right Ear: External ear normal.      Left Ear: External ear normal.      Nose: Nose normal.   Eyes:      Extraocular Movements: Extraocular movements intact.      Conjunctiva/sclera: Conjunctivae normal.   Neck:      Trachea: Trachea normal.   Cardiovascular:      Rate and Rhythm: Normal rate and regular rhythm.      Heart sounds: Normal heart sounds. "   Pulmonary:      Effort: Pulmonary effort is normal. No respiratory distress.   Abdominal:      General: Abdomen is flat.   Musculoskeletal:      Cervical back: Neck supple.      Comments:   Walks with cane   moves all limbs   Skin:     General: Skin is warm and dry.      Findings: No erythema.   Neurological:      Mental Status: He is alert and oriented to person, place, and time.      Comments: No gross motor or sensory deficits        Result Review :    The following data was reviewed by: Yarelis Ruiz MD on 04/03/2024:  Common labs          10/19/2023    17:01 10/19/2023    17:16 10/20/2023    06:37 10/21/2023    03:59   Common Labs   Glucose 185          EGFR  Am  41         Potassium 4.3          Chloride 96          WBC 9.20      7.44     6.17       Hemoglobin 14.2      12.4     12.9       Hematocrit 41.4      36.4     37.8       Platelets 201      180     205       Hemoglobin A1C   6.2           Details          This result is from an external source.                          Assessment and Plan     Diagnoses and all orders for this visit:    1. Benign essential hypertension (Primary)  -     carvedilol (COREG) 12.5 MG tablet; Take 1 tablet by mouth 2 (Two) Times a Day With Meals.  Dispense: 180 tablet; Refill: 1  -     olmesartan (BENICAR) 20 MG tablet; Take 1 tablet by mouth Daily.  Dispense: 90 tablet; Refill: 1    2. Mixed hyperlipidemia  -     CBC (No Diff)  -     Comprehensive Metabolic Panel  -     Lipid Panel  -     rosuvastatin (CRESTOR) 20 MG tablet; Take 1 tablet by mouth Every Night.  Dispense: 90 tablet; Refill: 1    3. Diabetes mellitus without complication  -     Hemoglobin A1c  -     Microalbumin / Creatinine Urine Ratio - Urine, Clean Catch  -     glimepiride (Amaryl) 1 MG tablet; Take 1 tablet by mouth Daily.  Dispense: 90 tablet; Refill: 1    4. Stage 3a chronic kidney disease    Plan:  1.  Benign essential hypertension: Will continue current medication, low-sodium diet advised,  Counseled to regularly check BP at home with goal averaging <130/80.   2.mixed hyperlipidemia:  reviewed  fasting CMP and lipid panel.  Diet and exercise counseled,  Will continue current medications  3. Diabetes  Mellitus  : Reviewed fasting CMP  and hba1c   6.7, diet and exercise counseled , Will continue current medications  4.  Ckd stage 3 a : Labs reviewed, monitor BMP, follow-up with nephrology           Follow Up     Return in about 23 weeks (around 9/11/2024).  Patient was given instructions and counseling regarding his condition or for health maintenance advice. Please see specific information pulled into the AVS if appropriate.

## 2024-06-20 RX ORDER — TAMSULOSIN HYDROCHLORIDE 0.4 MG/1
1 CAPSULE ORAL NIGHTLY
Qty: 90 CAPSULE | Refills: 1 | Status: SHIPPED | OUTPATIENT
Start: 2024-06-20

## 2024-08-09 ENCOUNTER — OFFICE VISIT (OUTPATIENT)
Dept: UROLOGY | Facility: CLINIC | Age: 89
End: 2024-08-09
Payer: COMMERCIAL

## 2024-08-09 VITALS
HEART RATE: 64 BPM | BODY MASS INDEX: 28.67 KG/M2 | DIASTOLIC BLOOD PRESSURE: 76 MMHG | RESPIRATION RATE: 12 BRPM | OXYGEN SATURATION: 96 % | TEMPERATURE: 97.9 F | SYSTOLIC BLOOD PRESSURE: 148 MMHG | WEIGHT: 199.8 LBS

## 2024-08-09 DIAGNOSIS — R39.9 LOWER URINARY TRACT SYMPTOMS (LUTS): ICD-10-CM

## 2024-08-09 DIAGNOSIS — R31.0 GROSS HEMATURIA: Primary | ICD-10-CM

## 2024-08-09 PROBLEM — R31.9 BLOOD IN URINE: Status: ACTIVE | Noted: 2023-12-12

## 2024-08-09 PROBLEM — E78.5 DYSLIPIDEMIA: Status: ACTIVE | Noted: 2023-03-20

## 2024-08-09 PROBLEM — I12.9 BENIGN HYPERTENSION WITH CHRONIC KIDNEY DISEASE: Status: ACTIVE | Noted: 2023-03-20

## 2024-08-09 PROBLEM — N40.0 BENIGN PROSTATIC HYPERPLASIA: Status: ACTIVE | Noted: 2023-03-20

## 2024-08-09 PROBLEM — N18.31 STAGE 3A CHRONIC KIDNEY DISEASE: Status: ACTIVE | Noted: 2023-03-20

## 2024-08-09 PROBLEM — E11.22 TYPE 2 DIABETES MELLITUS WITH DIABETIC CHRONIC KIDNEY DISEASE: Chronic | Status: ACTIVE | Noted: 2023-03-20

## 2024-08-09 PROBLEM — E55.9 VITAMIN D DEFICIENCY DUE TO CHRONIC KIDNEY DISEASE: Status: ACTIVE | Noted: 2023-03-20

## 2024-08-09 PROBLEM — N18.9 VITAMIN D DEFICIENCY DUE TO CHRONIC KIDNEY DISEASE: Status: ACTIVE | Noted: 2023-03-20

## 2024-08-09 LAB
BILIRUB BLD-MCNC: NEGATIVE MG/DL
CLARITY, POC: CLEAR
COLOR UR: YELLOW
EXPIRATION DATE: ABNORMAL
GLUCOSE UR STRIP-MCNC: NEGATIVE MG/DL
KETONES UR QL: NEGATIVE
LEUKOCYTE EST, POC: NEGATIVE
Lab: ABNORMAL
NITRITE UR-MCNC: NEGATIVE MG/ML
PH UR: 6 [PH] (ref 5–8)
PROT UR STRIP-MCNC: NEGATIVE MG/DL
RBC # UR STRIP: ABNORMAL /UL
SP GR UR: 1.02 (ref 1–1.03)
UROBILINOGEN UR QL: ABNORMAL

## 2024-08-09 RX ORDER — MULTIVITAMIN WITH IRON
250 TABLET ORAL
COMMUNITY

## 2024-08-09 RX ORDER — FINASTERIDE 5 MG/1
5 TABLET, FILM COATED ORAL DAILY
Qty: 90 TABLET | Refills: 4 | Status: SHIPPED | OUTPATIENT
Start: 2024-08-09

## 2024-08-09 RX ORDER — ASCORBATE CALCIUM 500 MG
TABLET ORAL
COMMUNITY

## 2024-08-09 NOTE — PROGRESS NOTES
Office Visit Established Male Patient     Patient Name: Kieran Clark  : 1935   MRN: [id number]     Chief Complaint:   Chief Complaint   Patient presents with    Blood in Urine    Follow-up       History of Present Illness: Mr. Kieran Clark is a 89 y.o. male who presents today for follow up with history of gross hematuria and BPH.  Since starting Finasteride he has had no episodes of gross hematuria.  He is tolerating Finasteride well and would like to remain on this medication.  He is not sexually active.  No new health complaints today.      IPSS Questionnaire (AUA-7):  Incomplete emptying  Over the past month, how often have you had a sensation of not emptying your bladder completely after you finished urinating?: Not at all (24)  Frequency  Over the past month, how often have you had to urinate again less than two hours after you finishied urinating ?: Not at all (24)  Intermittency  Over the past month, how often have you found you stopped and started again several time when you urinated ?: Less than 1 time in 5 (24)  Urgency  Over the last month, how often have you found it difficult  have you found it difficult to postpone urination ?: Not at all (24)  Weak Stream  Over the past month, how often have you had a weak urinary stream ?: Not at all (24)  Straining  Over the past month, how often have you had to push or strain to begin urination ?: Not at all (24)  Nocturia  Over the past month, how many times did you most typically get up to urinate from the time you went to bed until the time you got up in the morning ?: 1 time (24)  Quality of life due to urinary symptoms  If you were to spend the rest of your life with your urinary condition the way it is now, how would feel about that?: Mostly satisfied (24)    Scores  Total IPSS Score: 2 (24)  Total Score = Symptomatic Level: Mildly  symptomatic: 0-7 (24 0849)       Subjective      Review of System: ROS reviewed by me and is negative unless otherwise noted in HPI.      Past Medical History:   Past Medical History:   Diagnosis Date    Diabetes mellitus     Hyperlipidemia     Hypertension      Past Surgical History:   Past Surgical History:   Procedure Laterality Date    COLONOSCOPY  2018    [place]    SPINE SURGERY      WRIST SURGERY Right      Family History: History reviewed. No pertinent family history.    Social History:   Social History     Socioeconomic History    Marital status:    Tobacco Use    Smoking status: Former     Current packs/day: 0.00     Average packs/day: 0.5 packs/day for 25.0 years (12.5 ttl pk-yrs)     Types: Cigarettes     Start date:      Quit date:      Years since quittin.6     Passive exposure: Past    Smokeless tobacco: Never   Vaping Use    Vaping status: Never Used   Substance and Sexual Activity    Alcohol use: Yes     Comment: social    Drug use: Never    Sexual activity: Defer       Medications:     Current Outpatient Medications:     aspirin 81 MG oral suspension, , Disp: , Rfl:     carvedilol (COREG) 12.5 MG tablet, Take 1 tablet by mouth 2 (Two) Times a Day With Meals., Disp: 180 tablet, Rfl: 1    finasteride (PROSCAR) 5 MG tablet, Take 1 tablet by mouth Daily., Disp: 90 tablet, Rfl: 4    glimepiride (Amaryl) 1 MG tablet, Take 1 tablet by mouth Daily., Disp: 90 tablet, Rfl: 1    Gluc-Chonn-MSM-Boswellia-Vit D (GLUCOS CHONDROIT, BOSWELLIA, PO), , Disp: , Rfl:     Magnesium 250 MG tablet, Take 1 tablet by mouth., Disp: , Rfl:     olmesartan (BENICAR) 20 MG tablet, Take 1 tablet by mouth Daily., Disp: 90 tablet, Rfl: 1    rosuvastatin (CRESTOR) 20 MG tablet, Take 1 tablet by mouth Every Night., Disp: 90 tablet, Rfl: 1    tamsulosin (FLOMAX) 0.4 MG capsule 24 hr capsule, Take 1 capsule by mouth Every Night., Disp: 90 capsule, Rfl: 1    Cholecalciferol (Vitamin D3) 25 MCG (1000 UT)  capsule, , Disp: , Rfl:     Cyanocobalamin (Vitamin B12) 1000 MCG tablet controlled-release, , Disp: , Rfl:     Vitamin E 100 units tablet, Take  by mouth. (Patient not taking: Reported on 8/9/2024), Disp: , Rfl:     Allergies:   No Known Allergies    Objective     Physical Exam:   Vital Signs:   Vitals:    08/09/24 0849   BP: 148/76   BP Location: Left arm   Patient Position: Sitting   Cuff Size: Adult   Pulse: 64   Resp: 12   Temp: 97.9 °F (36.6 °C)   TempSrc: Temporal   SpO2: 96%   Weight: 90.6 kg (199 lb 12.8 oz)     Body mass index is 28.67 kg/m².   Physical Exam  Vitals and nursing note reviewed.   Constitutional:       General: He is not in acute distress.     Appearance: Normal appearance. He is not ill-appearing.   HENT:      Head: Normocephalic and atraumatic.   Pulmonary:      Effort: Pulmonary effort is normal.   Skin:     General: Skin is warm and dry.   Neurological:      General: No focal deficit present.      Mental Status: He is alert and oriented to person, place, and time.      Gait: Gait abnormal (walks with cane).   Psychiatric:         Mood and Affect: Mood normal.         Behavior: Behavior normal.      Labs  Brief Urine Lab Results  (Last result in the past 365 days)        Color   Clarity   Blood   Leuk Est   Nitrite   Protein   CREAT   Urine HCG        08/09/24 0900 Yellow   Clear   Moderate   Negative   Negative   Negative                 Lab Results   Component Value Date    GLUCOSE 185 (H) 10/19/2023    K 4.3 10/19/2023    CL 96 (L) 10/19/2023    EGFRIFAFRI 41 10/19/2023     Lab Results   Component Value Date    WBC 6.17 10/21/2023    HGB 12.9 (L) 10/21/2023    HCT 37.8 (L) 10/21/2023    MCV 87 10/21/2023     10/21/2023     Radiographic Studies  No Images in the past 120 days found.    I have reviewed the above labs and imaging.     PVR 0 ml.     Assessment / Plan      Assessment/Plan: Mr. Kieran Clark is a 89 y.o. male who presents today for follow up with history of gross  hematuria and BPH.  Since starting Finasteride he has had no episodes of gross hematuria. Continues to have blood on UA otherwise negative.   PVR 0 ml.  IPSS is 2.      Mr. Clark would like to continue on Finasteride as prescribed.  He is not having any side effect complaints.  He reports that he has a high deductible plan and is experiencing increased financial burden from numerous doctor visits.  Will have Mr. Clark follow up with me as needed and his PCP may refill his medications going forward.  He was agreeable to this plan.      Diagnoses and all orders for this visit:    1. Gross hematuria (Primary)  -     POC Urinalysis Dipstick, Automated  -     finasteride (PROSCAR) 5 MG tablet; Take 1 tablet by mouth Daily.  Dispense: 90 tablet; Refill: 4    2. Lower urinary tract symptoms (LUTS)  -     finasteride (PROSCAR) 5 MG tablet; Take 1 tablet by mouth Daily.  Dispense: 90 tablet; Refill: 4     Follow Up:   Return if symptoms worsen or fail to improve.    VALERIE Khan, MSN, FNP-C  [facility]

## 2024-09-04 ENCOUNTER — OFFICE VISIT (OUTPATIENT)
Dept: INTERNAL MEDICINE | Facility: CLINIC | Age: 89
End: 2024-09-04
Payer: COMMERCIAL

## 2024-09-04 VITALS
BODY MASS INDEX: 28.92 KG/M2 | TEMPERATURE: 99.4 F | RESPIRATION RATE: 16 BRPM | HEART RATE: 67 BPM | OXYGEN SATURATION: 97 % | SYSTOLIC BLOOD PRESSURE: 130 MMHG | WEIGHT: 202 LBS | HEIGHT: 70 IN | DIASTOLIC BLOOD PRESSURE: 62 MMHG

## 2024-09-04 DIAGNOSIS — E78.2 MIXED HYPERLIPIDEMIA: ICD-10-CM

## 2024-09-04 DIAGNOSIS — N18.31 STAGE 3A CHRONIC KIDNEY DISEASE: ICD-10-CM

## 2024-09-04 DIAGNOSIS — E11.9 DIABETES MELLITUS WITHOUT COMPLICATION: ICD-10-CM

## 2024-09-04 DIAGNOSIS — I10 BENIGN ESSENTIAL HYPERTENSION: Primary | ICD-10-CM

## 2024-09-04 PROCEDURE — 99214 OFFICE O/P EST MOD 30 MIN: CPT | Performed by: INTERNAL MEDICINE

## 2024-09-04 RX ORDER — CARVEDILOL 12.5 MG/1
12.5 TABLET ORAL 2 TIMES DAILY WITH MEALS
Qty: 180 TABLET | Refills: 1 | Status: SHIPPED | OUTPATIENT
Start: 2024-09-04

## 2024-09-04 RX ORDER — ROSUVASTATIN CALCIUM 20 MG/1
20 TABLET, COATED ORAL NIGHTLY
Qty: 90 TABLET | Refills: 1 | Status: SHIPPED | OUTPATIENT
Start: 2024-09-04

## 2024-09-04 RX ORDER — GLIMEPIRIDE 1 MG/1
1 TABLET ORAL DAILY
Qty: 90 TABLET | Refills: 1 | Status: SHIPPED | OUTPATIENT
Start: 2024-09-04

## 2024-09-04 RX ORDER — OLMESARTAN MEDOXOMIL 20 MG/1
20 TABLET ORAL DAILY
Qty: 90 TABLET | Refills: 1 | Status: SHIPPED | OUTPATIENT
Start: 2024-09-04

## 2024-09-04 NOTE — PROGRESS NOTES
"Chief Complaint  Hypertension, Hyperlipidemia, and Diabetes    Subjective        Kieran Clark presents to [facility]  History of Present Illness  HPI: Patient is here to follow up on the blood pressure  The patient is taking the blood pressure medications as prescribed and has had no side effects. The patient is also here to follow up on the cholesterol and  sugar and ckd and lab work done .  He follows up with nephrology the patient also needs refills on medications .   Hyperlipidemia   Pertinent negatives include no chest pain or shortness of breath.   Hypertension   Pertinent negatives include no chest pain, palpitations or shortness of breath.    Objective   Vital Signs:  /62 Comment: lt arm recheck  Pulse 67   Temp 99.4 °F (37.4 °C)   Resp 16   Ht 177.8 cm (70\")   Wt 91.6 kg (202 lb)   SpO2 97%   BMI 28.98 kg/m²   Estimated body mass index is 28.98 kg/m² as calculated from the following:    Height as of this encounter: 177.8 cm (70\").    Weight as of this encounter: 91.6 kg (202 lb).        Vitals:    09/04/24 0829 09/04/24 0918   BP: 152/72 130/62  Comment: lt arm recheck   BP Location: Right arm    Patient Position: Sitting    Cuff Size: Adult    Pulse: 67    Resp: 16    Temp: 99.4 °F (37.4 °C)    SpO2: 97%    Weight: 91.6 kg (202 lb)    Height: 177.8 cm (70\")    PainSc:   2            Physical Exam  Vitals and nursing note reviewed.   Constitutional:       General: He is not in acute distress.     Appearance: Normal appearance. He is not diaphoretic.   HENT:      Head: Normocephalic and atraumatic.      Right Ear: External ear normal.      Left Ear: External ear normal.      Nose: Nose normal.   Eyes:      Extraocular Movements: Extraocular movements intact.      Conjunctiva/sclera: Conjunctivae normal.   Neck:      Trachea: Trachea normal.   Cardiovascular:      Rate and Rhythm: Normal rate and regular rhythm.      Heart sounds: Normal heart sounds. "   Pulmonary:      Effort: Pulmonary effort is normal. No respiratory distress.   Abdominal:      General: Abdomen is flat.   Musculoskeletal:      Cervical back: Neck supple.      Comments: Walks with cane   Skin:     General: Skin is warm and dry.      Findings: No erythema.   Neurological:      Mental Status: He is alert and oriented to person, place, and time.      Comments: No gross motor or sensory deficits        Result Review :  The following data was reviewed by: Yarelis Ruiz MD on 09/04/2024:  Common labs          10/20/2023    06:37 10/21/2023    03:59 6/19/2024    10:18   Common Labs   WBC 7.44     6.17        Hemoglobin 12.4     12.9        Hematocrit 36.4     37.8        Platelets 180     205        Hemoglobin A1C 6.2         Uric Acid   5.7          Details          This result is from an external source.                       Assessment and Plan   Diagnoses and all orders for this visit:    1. Benign essential hypertension (Primary)  -     carvedilol (COREG) 12.5 MG tablet; Take 1 tablet by mouth 2 (Two) Times a Day With Meals.  Dispense: 180 tablet; Refill: 1  -     olmesartan (BENICAR) 20 MG tablet; Take 1 tablet by mouth Daily.  Dispense: 90 tablet; Refill: 1    2. Mixed hyperlipidemia  -     CBC (No Diff)  -     Comprehensive Metabolic Panel  -     Lipid Panel  -     rosuvastatin (CRESTOR) 20 MG tablet; Take 1 tablet by mouth Every Night.  Dispense: 90 tablet; Refill: 1    3. Diabetes mellitus without complication  -     Hemoglobin A1c  -     Microalbumin / Creatinine Urine Ratio - Urine, Clean Catch  -     glimepiride (Amaryl) 1 MG tablet; Take 1 tablet by mouth Daily.  Dispense: 90 tablet; Refill: 1    4. Stage 3a chronic kidney disease  -     Comprehensive Metabolic Panel      Plan:  1.  Benign essential hypertension: Will continue current medication, low-sodium diet advised, Counseled to regularly check BP at home with goal averaging <130/80.   2.mixed hyperlipidemia:   Reviewed fasting CMP  and lipid panel.  Diet and exercise counseled,  Will continue current medications  3. Diabetes  Mellitus  : Reviewed   fasting CMP  and hba1c  6.5  , diet and exercise counseled , Will continue current medications  4.  CKD stage IIIa: Oral hydration, monitor BMP and follow-up with nephrology         Follow Up   Return in about 5 months (around 2/18/2025).  Patient was given instructions and counseling regarding his condition or for health maintenance advice. Please see specific information pulled into the AVS if appropriate.

## 2025-02-10 ENCOUNTER — OFFICE VISIT (OUTPATIENT)
Dept: INTERNAL MEDICINE | Facility: CLINIC | Age: OVER 89
End: 2025-02-10
Payer: COMMERCIAL

## 2025-02-10 VITALS
WEIGHT: 206.8 LBS | SYSTOLIC BLOOD PRESSURE: 175 MMHG | DIASTOLIC BLOOD PRESSURE: 64 MMHG | TEMPERATURE: 98.5 F | HEART RATE: 73 BPM | RESPIRATION RATE: 18 BRPM | OXYGEN SATURATION: 97 % | HEIGHT: 70 IN | BODY MASS INDEX: 29.6 KG/M2

## 2025-02-10 DIAGNOSIS — E78.2 MIXED HYPERLIPIDEMIA: ICD-10-CM

## 2025-02-10 DIAGNOSIS — I10 BENIGN ESSENTIAL HYPERTENSION: Primary | ICD-10-CM

## 2025-02-10 DIAGNOSIS — N18.32 STAGE 3B CHRONIC KIDNEY DISEASE: ICD-10-CM

## 2025-02-10 DIAGNOSIS — E11.9 DIABETES MELLITUS WITHOUT COMPLICATION: ICD-10-CM

## 2025-02-10 PROCEDURE — 99214 OFFICE O/P EST MOD 30 MIN: CPT | Performed by: INTERNAL MEDICINE

## 2025-02-10 RX ORDER — ROSUVASTATIN CALCIUM 20 MG/1
20 TABLET, COATED ORAL NIGHTLY
Qty: 90 TABLET | Refills: 1 | Status: SHIPPED | OUTPATIENT
Start: 2025-02-10

## 2025-02-10 RX ORDER — CARVEDILOL 12.5 MG/1
12.5 TABLET ORAL 2 TIMES DAILY WITH MEALS
Qty: 180 TABLET | Refills: 1 | Status: SHIPPED | OUTPATIENT
Start: 2025-02-10

## 2025-02-10 RX ORDER — OLMESARTAN MEDOXOMIL 20 MG/1
20 TABLET ORAL DAILY
Qty: 90 TABLET | Refills: 1 | Status: SHIPPED | OUTPATIENT
Start: 2025-02-10

## 2025-02-10 RX ORDER — GLIMEPIRIDE 2 MG/1
2 TABLET ORAL DAILY
Qty: 90 TABLET | Refills: 1 | Status: SHIPPED | OUTPATIENT
Start: 2025-02-10

## 2025-02-10 NOTE — PROGRESS NOTES
"Chief Complaint  Hypertension, Hyperlipidemia, and Diabetes    Subjective        Kieran Clark presents to [facility]  HPI: Patient is here to follow up on the blood pressure  The patient is taking the blood pressure medications as prescribed and has had no side effects. The patient is also here to follow up on the cholesterol and  sugar and  ckd and had lab work done .  He has been baking lately , The patient also needs refills on medications .   Hypertension   Pertinent negatives include no chest pain, palpitations or shortness of breath.   Hyperlipidemia   Pertinent negatives include no chest pain or shortness of breath.   Diabetes   Pertinent negatives for hypoglycemia include no dizziness, nervousness/anxiousness or pallor. Pertinent negatives for diabetes include no chest pain, no shortness of breath  Patient is on acei/arb       Objective   Vital Signs:  /64   Pulse 73   Temp 98.5 °F (36.9 °C) (Temporal)   Resp 18   Ht 177.8 cm (70\")   Wt 93.8 kg (206 lb 12.8 oz)   SpO2 97%   BMI 29.67 kg/m²   Estimated body mass index is 29.67 kg/m² as calculated from the following:    Height as of this encounter: 177.8 cm (70\").    Weight as of this encounter: 93.8 kg (206 lb 12.8 oz).            Physical Exam  Vitals and nursing note reviewed.   Constitutional:       General: He is not in acute distress.     Appearance: Normal appearance. He is not diaphoretic.   HENT:      Head: Normocephalic and atraumatic.      Right Ear: External ear normal.      Left Ear: External ear normal.      Nose: Nose normal.   Eyes:      Extraocular Movements: Extraocular movements intact.      Conjunctiva/sclera: Conjunctivae normal.   Neck:      Trachea: Trachea normal.   Cardiovascular:      Rate and Rhythm: Normal rate and regular rhythm.      Heart sounds: Normal heart sounds.   Pulmonary:      Effort: Pulmonary effort is normal. No respiratory distress.   Abdominal:      General: Abdomen is " flat.   Musculoskeletal:      Cervical back: Neck supple.      Comments: Walks with cane  Moves all limbs   Skin:     General: Skin is warm and dry.      Findings: No erythema.   Neurological:      Mental Status: He is alert and oriented to person, place, and time.      Comments: No gross motor or sensory deficits        Result Review :  The following data was reviewed by: Yarelis Ruiz MD on 02/10/2025:  Common labs          6/19/2024    10:18   Common Labs   Uric Acid 5.7          Details          This result is from an external source.                       Assessment and Plan   Diagnoses and all orders for this visit:    1. Benign essential hypertension (Primary)  -     olmesartan (BENICAR) 20 MG tablet; Take 1 tablet by mouth Daily.  Dispense: 90 tablet; Refill: 1  -     carvedilol (COREG) 12.5 MG tablet; Take 1 tablet by mouth 2 (Two) Times a Day With Meals.  Dispense: 180 tablet; Refill: 1    2. Mixed hyperlipidemia  -     rosuvastatin (CRESTOR) 20 MG tablet; Take 1 tablet by mouth Every Night.  Dispense: 90 tablet; Refill: 1  -     CBC (No Diff)  -     Comprehensive Metabolic Panel  -     Lipid Panel    3. Diabetes mellitus without complication  -     glimepiride (Amaryl) 2 MG tablet; Take 1 tablet by mouth Daily.  Dispense: 90 tablet; Refill: 1  -     Hemoglobin A1c  -     Microalbumin / Creatinine Urine Ratio - Urine, Clean Catch    4. Stage 3b chronic kidney disease  -     Comprehensive Metabolic Panel    Plan:  1.  Benign essential hypertension: Will continue current medication, low-sodium diet advised, Counseled to regularly check BP at home with goal averaging <130/80.   2.mixed hyperlipidemia:  reviewed   fasting CMP and lipid panel.  Diet and exercise counseled,  Will continue current medications  3. Diabetes  Mellitus  :  reviewed   fasting CMP  and hba1c   7.7 , diet and exercise counseled , Will  increase to amaryl 2mg po qd   4.  Ckd stage 3 b : oral hydration , monitor bmp            Follow Up    Return in about 5 months (around 7/8/2025).  Patient was given instructions and counseling regarding his condition or for health maintenance advice. Please see specific information pulled into the AVS if appropriate.

## 2025-03-31 RX ORDER — TAMSULOSIN HYDROCHLORIDE 0.4 MG/1
1 CAPSULE ORAL NIGHTLY
Qty: 90 CAPSULE | Refills: 3 | Status: SHIPPED | OUTPATIENT
Start: 2025-03-31

## 2025-07-10 ENCOUNTER — OFFICE VISIT (OUTPATIENT)
Dept: INTERNAL MEDICINE | Facility: CLINIC | Age: OVER 89
End: 2025-07-10
Payer: COMMERCIAL

## 2025-07-10 VITALS
TEMPERATURE: 97.6 F | DIASTOLIC BLOOD PRESSURE: 55 MMHG | HEART RATE: 59 BPM | WEIGHT: 195 LBS | BODY MASS INDEX: 27.92 KG/M2 | SYSTOLIC BLOOD PRESSURE: 143 MMHG | OXYGEN SATURATION: 97 % | HEIGHT: 70 IN | RESPIRATION RATE: 18 BRPM

## 2025-07-10 DIAGNOSIS — N40.1 BPH WITH LOWER URINARY TRACT SYMPTOMS WITHOUT URINARY OBSTRUCTION: ICD-10-CM

## 2025-07-10 DIAGNOSIS — E11.9 DIABETES MELLITUS WITHOUT COMPLICATION: ICD-10-CM

## 2025-07-10 DIAGNOSIS — I10 BENIGN ESSENTIAL HYPERTENSION: Primary | ICD-10-CM

## 2025-07-10 DIAGNOSIS — N18.32 STAGE 3B CHRONIC KIDNEY DISEASE: ICD-10-CM

## 2025-07-10 DIAGNOSIS — E78.2 MIXED HYPERLIPIDEMIA: ICD-10-CM

## 2025-07-10 RX ORDER — FINASTERIDE 5 MG/1
5 TABLET, FILM COATED ORAL NIGHTLY
Qty: 90 TABLET | Refills: 1 | Status: SHIPPED | OUTPATIENT
Start: 2025-07-10

## 2025-07-10 RX ORDER — OLMESARTAN MEDOXOMIL 20 MG/1
20 TABLET ORAL DAILY
Qty: 90 TABLET | Refills: 1 | Status: SHIPPED | OUTPATIENT
Start: 2025-07-10

## 2025-07-10 RX ORDER — CARVEDILOL 12.5 MG/1
12.5 TABLET ORAL 2 TIMES DAILY WITH MEALS
Qty: 180 TABLET | Refills: 1 | Status: SHIPPED | OUTPATIENT
Start: 2025-07-10

## 2025-07-10 RX ORDER — GLIMEPIRIDE 1 MG/1
1 TABLET ORAL
Qty: 90 TABLET | Refills: 1 | Status: SHIPPED | OUTPATIENT
Start: 2025-07-10

## 2025-07-10 RX ORDER — ROSUVASTATIN CALCIUM 20 MG/1
20 TABLET, COATED ORAL NIGHTLY
Qty: 90 TABLET | Refills: 1 | Status: SHIPPED | OUTPATIENT
Start: 2025-07-10

## 2025-07-10 RX ORDER — TAMSULOSIN HYDROCHLORIDE 0.4 MG/1
1 CAPSULE ORAL NIGHTLY
Qty: 90 CAPSULE | Refills: 1 | Status: SHIPPED | OUTPATIENT
Start: 2025-07-10

## 2025-07-10 NOTE — PROGRESS NOTES
"Chief Complaint  Hypertension, Diabetes, Hyperlipidemia, and Chronic Kidney Disease    Subjective        Kieran Clark presents to [facility]  HPI: Patient is here to follow up on the blood pressure  The patient is taking the blood pressure medications as prescribed and has had no side effects. The patient is also here to follow up on the cholesterol and is trying to follow a diet. The patient is also here to follow on sugar and  ckd and had lab work done . The patient also is following on BPH and needs refills on medications .   Hypertension   Pertinent negatives include no chest pain, palpitations or shortness of breath.   Hyperlipidemia   Pertinent negatives include no chest pain or shortness of breath.   Diabetes   Pertinent negatives for hypoglycemia include no dizziness, nervousness/anxiousness or pallor. Pertinent negatives for diabetes include no chest pain, no shortness of breath  Patient is on acei/arb       Objective   Vital Signs:  /55   Pulse 59   Temp 97.6 °F (36.4 °C) (Temporal)   Resp 18   Ht 177.8 cm (70\")   Wt 88.5 kg (195 lb)   SpO2 97%   BMI 27.98 kg/m²   Estimated body mass index is 27.98 kg/m² as calculated from the following:    Height as of this encounter: 177.8 cm (70\").    Weight as of this encounter: 88.5 kg (195 lb).    BMI is >= 25 and <30. (Overweight) The following options were offered after discussion;: weight loss educational material (shared in after visit summary), exercise counseling/recommendations, and nutrition counseling/recommendations      Physical Exam  Vitals and nursing note reviewed.   Constitutional:       General: He is not in acute distress.     Appearance: Normal appearance. He is not diaphoretic.   HENT:      Head: Normocephalic and atraumatic.      Right Ear: External ear normal.      Left Ear: External ear normal.      Nose: Nose normal.   Eyes:      Extraocular Movements: Extraocular movements intact.      " Conjunctiva/sclera: Conjunctivae normal.   Neck:      Trachea: Trachea normal.   Cardiovascular:      Rate and Rhythm: Normal rate and regular rhythm.      Heart sounds: Normal heart sounds.   Pulmonary:      Effort: Pulmonary effort is normal. No respiratory distress.   Abdominal:      General: Abdomen is flat.   Musculoskeletal:      Cervical back: Neck supple.      Comments: Walks with cane  Moves all limbs   Skin:     General: Skin is warm and dry.      Findings: No erythema.   Neurological:      Mental Status: He is alert and oriented to person, place, and time.      Comments: No gross motor or sensory deficits        Result Review :  The following data was reviewed by: Yarelis Ruiz MD on 07/10/2025:              Assessment and Plan   Diagnoses and all orders for this visit:    1. Benign essential hypertension (Primary)  -     carvedilol (COREG) 12.5 MG tablet; Take 1 tablet by mouth 2 (Two) Times a Day With Meals.  Dispense: 180 tablet; Refill: 1  -     olmesartan (BENICAR) 20 MG tablet; Take 1 tablet by mouth Daily.  Dispense: 90 tablet; Refill: 1    2. Mixed hyperlipidemia  -     rosuvastatin (CRESTOR) 20 MG tablet; Take 1 tablet by mouth Every Night.  Dispense: 90 tablet; Refill: 1  -     CBC (No Diff)  -     Comprehensive Metabolic Panel  -     Lipid Panel    3. Diabetes mellitus without complication  -     glimepiride (Amaryl) 1 MG tablet; Take 1 tablet by mouth Daily With Breakfast.  Dispense: 90 tablet; Refill: 1  -     Hemoglobin A1c  -     Microalbumin / Creatinine Urine Ratio - Urine, Clean Catch    4. Stage 3b chronic kidney disease  -     Comprehensive Metabolic Panel    5. BPH with lower urinary tract symptoms without urinary obstruction  -     tamsulosin (FLOMAX) 0.4 MG capsule 24 hr capsule; Take 1 capsule by mouth Every Night.  Dispense: 90 capsule; Refill: 1  -     finasteride (PROSCAR) 5 MG tablet; Take 1 tablet by mouth Every Night.  Dispense: 90 tablet; Refill: 1      Plan:  1.  Benign  essential hypertension: Will continue current medication, low-sodium diet advised, Counseled to regularly check BP at home with goal averaging <130/80.   2.mixed hyperlipidemia:  reviewed  fasting CMP and lipid panel.  Diet and exercise counseled,  Will continue current medications  3.  Diabetes mellitus:   reviewed  fasting CMP  and hba1c  6.4  , diet and exercise counseled , will decrease to glimepiride 1 mg daily  4.  CKD stage IIIb: Oral hydration, monitor BMP  5.  BPH: Refill Proscar and Flomax       Follow Up   Return in about 5 months (around 12/2/2025).  Patient was given instructions and counseling regarding his condition or for health maintenance advice. Please see specific information pulled into the AVS if appropriate.

## 2025-07-16 ENCOUNTER — TELEPHONE (OUTPATIENT)
Dept: INTERNAL MEDICINE | Facility: CLINIC | Age: OVER 89
End: 2025-07-16
Payer: COMMERCIAL

## 2025-07-16 NOTE — TELEPHONE ENCOUNTER
----- Message from Yarelis Ruiz sent at 7/1/2025  3:37 PM EDT -----  Hb 13.6  ----- Message -----  From: Brigid Mathur RegSched Rep  Sent: 7/1/2025   3:03 PM EDT  To: Yarelis Ruiz MD